# Patient Record
Sex: FEMALE | Race: WHITE
[De-identification: names, ages, dates, MRNs, and addresses within clinical notes are randomized per-mention and may not be internally consistent; named-entity substitution may affect disease eponyms.]

---

## 2016-12-31 VITALS — SYSTOLIC BLOOD PRESSURE: 145 MMHG | DIASTOLIC BLOOD PRESSURE: 83 MMHG

## 2017-01-05 ENCOUNTER — HOSPITAL ENCOUNTER (EMERGENCY)
Dept: HOSPITAL 44 - ED | Age: 62
Discharge: HOME | End: 2017-01-05
Payer: SELF-PAY

## 2017-01-05 DIAGNOSIS — J20.9: Primary | ICD-10-CM

## 2017-01-05 PROCEDURE — 71020: CPT

## 2017-01-05 PROCEDURE — 87400 INFLUENZA A/B EACH AG IA: CPT

## 2017-01-05 PROCEDURE — 99283 EMERGENCY DEPT VISIT LOW MDM: CPT

## 2017-01-05 RX ADMIN — AZITHROMYCIN ONE MG: 250 TABLET, FILM COATED ORAL at 21:05

## 2017-01-05 NOTE — DIAGNOSTIC IMAGING REPORT
[facility]

 [address] [facility].[address] [address]

~

Report Submission Date: 2017 8:33:31 PM CST







Patient ~ Study  

 

Name: HOSEA MORATAYA ~ Date: 2017 8:22:11 PM CST

 

MRN: [id number] ~ Modality Type: CR

 

Gender: F ~ Description: CHEST

 

: 53 ~ Institution: [facility]

 

Physician: DESTINY CASANOVA  ~ ~ ~





~

Chest PA and lateral views 

Clinical history: Cough and congestion with fever 



A borderline heart shadow. No acute infiltrates or pleural effusion. Normal 
bony thorax. 



Impression: 

Borderline heart shadow 

No acute infiltrates or pleural effusion

~

Electronically signed on 2017 8:33:31 PM CST by:

Jorge MEDEROS

## 2017-01-05 NOTE — ED PHYSICIAN DOCUMENTATION
General Adult





- HISTORIAN


Historian: patient





- HPI


Stated Complaint: Past 2 days, chest congestion/sinus drainage/body aches


Chief Complaint: General Adult


Additional Information: 





2 days non-productive cough, rhinorrhea, feverish, body aches. Got flu shot 

about a week ago. 





- ROS


CONST: fever (feverish)





- PAST HX


Past History: COPD


Allergies/Adverse Reactions: 


 Allergies











Allergy/AdvReac Type Severity Reaction Status Date / Time


 


morphine Allergy Severe Anaphylaxis Verified 01/05/17 19:33














Home Medications: 


 Ambulatory Orders











 Medication  Instructions  Recorded


 


Ramipril [Altace] 10 mg PO BID 02/22/15














- SOCIAL HX


Smoking History: non-smoker, secondhand





- FAMILY HX


Family History: No





- VITAL SIGNS


Vital Signs: 





 Vital Signs











Temp Pulse Resp BP Pulse Ox


 


 98.2 F   79   20   145/83   97 


 


 01/05/17 18:55  01/05/17 18:55  01/05/17 18:55  12/31/16 19:10  01/05/17 18:55














- REVIEWED ASSESSMENTS


Nursing Assessment  Reviewed: Yes


Vitals Reviewed: Yes





Progress





- Progress


Progress: 








Chest PA and lateral views 


Clinical history: Cough and congestion with fever 





A borderline heart shadow. No acute infiltrates or pleural effusion. Normal 

bony thorax. 





Impression: 


Borderline heart shadow 


No acute infiltrates or pleural effusion





 








Electronically signed on Jan 5, 2017 8:33:31 PM CST by:





Jorge Tobar





Influenza swab negative





ED Results Lab/Radiology





- Orders


Orders: 





 ED Orders











 Category Date Time Status


 


 CHEST 2 VIEW [CHEST P.A.&LAT 2 VIEWS] [RAD] Stat Exams  01/05/17 Ordered


 


 INFLUENZA A&B Stat Lab  01/05/17 19:59 Ordered














General Adult Physical Exam





- PHYSICAL EXAM


GENERAL APPEARANCE: moderate distress


EENT: eye inspection normal, ENT inspection normal, pharynx normal, JIMMY, other 

(clear rhinorrhea)


NECK: normal inspection, supple


RESPIRATORY: no resp distress, breath sounds normal


CVS: reg rate & rhythm, heart sounds normal


ABDOMEN: soft, normal bowel sounds


RECTAL: deferred


BACK: normal inspection


SKIN: warm/dry, normal color


EXTREMITIES: normal range of motion, no evidence of injury


NEURO: CN's nml as tested, motor nml, sensation nml, cognition normal





Discharge


Clincal Impression: 


 Bronchitis





Referrals: 


Jorge Herrmann [Primary Care Provider] - 2 Days


Home Medications: 


Ambulatory Orders





Ramipril [Altace] 10 mg PO BID 02/22/15 








Condition: Fair


Disposition: 01 HOME, SELF-CARE


Decision to Admit: NO


Decision Time: 21:00

## 2017-02-16 ENCOUNTER — HOSPITAL ENCOUNTER (EMERGENCY)
Dept: HOSPITAL 44 - ED | Age: 62
Discharge: HOME | End: 2017-02-16
Payer: SELF-PAY

## 2017-02-16 VITALS — SYSTOLIC BLOOD PRESSURE: 134 MMHG | DIASTOLIC BLOOD PRESSURE: 85 MMHG

## 2017-02-16 DIAGNOSIS — M54.32: Primary | ICD-10-CM

## 2017-02-16 PROCEDURE — 96372 THER/PROPH/DIAG INJ SC/IM: CPT

## 2017-02-16 PROCEDURE — 99283 EMERGENCY DEPT VISIT LOW MDM: CPT

## 2017-02-16 NOTE — ED PHYSICIAN DOCUMENTATION
General Adult





- HISTORIAN


Historian: patient





- HPI


Stated Complaint: left hip pain


Chief Complaint: General Adult


Further Comments: yes (61 year old female patient presents with left hip pain 

for the past 2 days, denies any fall or injury.  States she took Tylenol 

yesterday but it didn't help.)





- ROS


CONST: no problems


EYES/ENT: none


CVS/RESP: none


GI/: none


MS/SKIN/LYMPH: joint pain (left hip)





- PAST HX


Past History: COPD, hypertension


Allergies/Adverse Reactions: 


 Allergies











Allergy/AdvReac Type Severity Reaction Status Date / Time


 


morphine Allergy Severe Anaphylaxis Verified 01/05/17 19:33














Home Medications: 


 Ambulatory Orders











 Medication  Instructions  Recorded


 


Unobtainable [Unobtainable]  02/16/17














- SOCIAL HX


Smoking History: non-smoker





- FAMILY HX


Family History: No





- VITAL SIGNS


Vital Signs: 





 Vital Signs











Temp Pulse Resp BP Pulse Ox


 


 98.1 F   80   16   134/85   97 


 


 02/16/17 16:10  02/16/17 16:10  02/16/17 16:10  02/16/17 16:10  02/16/17 16:10














- REVIEWED ASSESSMENTS


Nursing Assessment  Reviewed: Yes


Vitals Reviewed: Yes





ED Results Lab/Radiology





- Orders


Orders: 





 ED Orders











 Category Date Time Status


 


 Ketorolac Tromethamine [Toradol] Med  02/16/17 16:27 Once





 60 mg IM NOW ONE   














General Adult Physical Exam





- PHYSICAL EXAM


GENERAL APPEARANCE: mild distress


EENT: eye inspection normal, JIMMY


RESPIRATORY: no resp distress, chest non-tender, breath sounds normal


CVS: reg rate & rhythm, heart sounds normal, equal pulses, no murmur, no gallop

, PMI nml, no JVD, no friction rub, 24


ABDOMEN: soft, no organomegaly, normal bowel sounds, no abdominal bruit, no 

distension


SKIN: normal color, warm/dry, NR, INT, PAL, DR


EXTREMITIES: normal range of motion, no evidence of injury, no edema, other (

left hip pain with palpation, patient states it radiates down the left leg.  No 

shortening or rotation noted. )


NEURO: oriented X3, CN's nml as tested, motor nml, sensation nml, mood/affect 

nml





Discharge


Clincal Impression: 


Sciatica


Qualifiers:


 Laterality: left Qualified Code(s): M54.32 - Sciatica, left side





Referrals: 


Jorge Herrmann [Primary Care Provider] - 2 Days


Home Medications: 


Ambulatory Orders





Unobtainable [Unobtainable]  02/16/17 








Condition: Stable


Disposition: 01 HOME, SELF-CARE


Decision to Admit: NO


Decision Time: 16:31

## 2017-05-05 ENCOUNTER — HOSPITAL ENCOUNTER (EMERGENCY)
Dept: HOSPITAL 44 - ED | Age: 62
Discharge: HOME | End: 2017-05-05
Payer: COMMERCIAL

## 2017-05-05 VITALS — SYSTOLIC BLOOD PRESSURE: 120 MMHG | DIASTOLIC BLOOD PRESSURE: 80 MMHG

## 2017-05-05 DIAGNOSIS — M54.32: Primary | ICD-10-CM

## 2017-05-05 PROCEDURE — 99283 EMERGENCY DEPT VISIT LOW MDM: CPT

## 2017-05-05 NOTE — ED PHYSICIAN DOCUMENTATION
General Adult





- HISTORIAN


Historian: patient, child (son)





- HPI


Stated Complaint: hip pain


Chief Complaint: General Adult


Additional Information: 





Left hip pain for months. Recently had x-rays per Dr. Herrmann. Pt's son 

received letter in mail that said x-rays showed arthritis. When son called 

[facility] office he was told no doctors in the office and to bring pt to ER. 

Pain is unchanged. Denies numbness, tingling, loss of bowel or bladder control. 





- ROS


CONST: no problems





- PAST HX


Past History: hypertension


Allergies/Adverse Reactions: 


 Allergies











Allergy/AdvReac Type Severity Reaction Status Date / Time


 


morphine Allergy Severe Anaphylaxis Verified 05/05/17 17:57














Home Medications: 


 Ambulatory Orders











 Medication  Instructions  Recorded


 


Ramipril [Altace] 10 mg PO BID 02/16/17


 


predniSONE [Deltasone] 20 mg PO QD #7 tablet 05/05/17














- SOCIAL HX


Smoking History: cigarettes





- FAMILY HX


Family History: No





- VITAL SIGNS


Vital Signs: 





 Vital Signs











Temp Pulse Resp BP Pulse Ox


 


 98.1 F   95 H  16   121/78   99 


 


 05/05/17 17:39  05/05/17 17:39  05/05/17 17:39  05/05/17 17:39  05/05/17 17:39














- REVIEWED ASSESSMENTS


Nursing Assessment  Reviewed: Yes


Vitals Reviewed: Yes





Progress





- Progress


Progress: 





Make an appointment with your provider to see how he would like to treat your 

arthritis. 





General Adult Physical Exam





- PHYSICAL EXAM


GENERAL APPEARANCE: mild distress


EENT: eye inspection normal, ENT inspection normal


NECK: normal inspection


RESPIRATORY: no resp distress


BACK: normal inspection


SKIN: warm/dry, normal color


EXTREMITIES: no evidence of injury, no edema, other (left hip rotation w/o pain)


NEURO: CN's nml as tested, motor nml, sensation nml, other (reflexes 2+ 

throughout. Can plantar and dorsi flex toes against resistance)





Discharge


Clincal Impression: 


 Sciatica





Prescriptions: 


predniSONE [Deltasone] 20 mg PO QD #7 tablet


Home Medications: 


Ambulatory Orders





Ramipril [Altace] 10 mg PO BID 02/16/17 


predniSONE [Deltasone] 20 mg PO QD #7 tablet 05/05/17 








Condition: Good


Disposition: 01 HOME, SELF-CARE


Decision to Admit: NO


Decision Time: 18:14

## 2017-05-11 ENCOUNTER — HOSPITAL ENCOUNTER (EMERGENCY)
Dept: HOSPITAL 44 - ED | Age: 62
Discharge: HOME | End: 2017-05-11
Payer: COMMERCIAL

## 2017-05-11 VITALS — SYSTOLIC BLOOD PRESSURE: 134 MMHG | DIASTOLIC BLOOD PRESSURE: 72 MMHG

## 2017-05-11 DIAGNOSIS — R51: Primary | ICD-10-CM

## 2017-05-11 DIAGNOSIS — E11.8: ICD-10-CM

## 2017-05-11 PROCEDURE — 96372 THER/PROPH/DIAG INJ SC/IM: CPT

## 2017-05-11 PROCEDURE — 99283 EMERGENCY DEPT VISIT LOW MDM: CPT

## 2017-05-11 PROCEDURE — 81002 URINALYSIS NONAUTO W/O SCOPE: CPT

## 2017-05-11 RX ADMIN — ORPHENADRINE CITRATE ONE MG: 30 INJECTION, SOLUTION INTRAMUSCULAR; INTRAVENOUS at 19:58

## 2017-05-11 RX ADMIN — KETOROLAC TROMETHAMINE ONE MG: 30 INJECTION, SOLUTION INTRAMUSCULAR at 19:59

## 2017-05-12 LAB
APPEARANCE UR: CLEAR
COLOR,URINE: YELLOW
PH UR STRIP: 7 [PH] (ref 5–8)
RBC UR QL: NEGATIVE
UROBILINOGEN URINE: 0.2 EU (ref 0.2–1)

## 2017-05-12 NOTE — ED PHYSICIAN DOCUMENTATION
Headache





- HISTORIAN


Historian: patient





- HPI


Stated Complaint: headache


Chief Complaint: Headache


Additional Information: 





burns and increased freq of urination


Onset: hours


Timing: still present


New Gradual Onset: No


Exposure To: none


Severity: moderate


Quality: similar to previous, other (whole head)


Associated Symptoms: denies: problems with vision, sensitivity to light, nausea

, vomiting, speech problems


Preceding Symptoms: denies: visual disturbance


Further Comments: no


Last known Well Code/Unknown Code: Unknown





- ROS


NEURO/PSYCH: denies: confusion, anxiety, depression, fainting


EYES/ENT: denies: sore throat, difficulty swallowing, sinus pain, drainage


CVS/RESP: none


GI/: problems urinating (burning).  denies: abdominal pain, diarrhea, 

incontinence


MS/SKIN/LYMPH: denies: muscle aches, back pain, rash, skin lesions, swollen 

glands


all systems neg except as marked: Yes





- PAST HX


Medical History: hypertension


Surgical History: no surgical history


Immunizations: referred to PCP


Allergies/Adverse Reactions: 


 Allergies











Allergy/AdvReac Type Severity Reaction Status Date / Time


 


morphine Allergy Severe Anaphylaxis Verified 05/11/17 19:05














Home Medications: 


 Ambulatory Orders











 Medication  Instructions  Recorded


 


Ramipril [Altace] 10 mg PO BID 02/16/17














- SOCIAL HX


Smoking History: cigarettes


Alcohol Use: none





- Family HX


Family History: none





- VITAL SIGNS


Vital Signs: 





 Vital Signs











Temp Pulse Resp BP Pulse Ox


 


 98.4 F   76   18   134/72   97 


 


 05/11/17 20:31  05/11/17 20:31  05/11/17 20:31  05/11/17 20:31  05/11/17 20:31














- REVIEWED ASSESSMENTS


Nursing Assessment  Reviewed: Yes


Vitals Reviewed: Yes





Progress





- Results/Orders


Results/Orders: 





no testing ordered





- Progress


Progress: 





pt. given 60 mg toradol and 60 mg norflex im in er





Critical Care Note





- Critical Care Note


Total Time (mins): 0





ED Results Lab/Radiology





- Lab Results


Lab Results: 





 Lab Results











  05/11/17





  19:50


 


Urine Color  Yellow 





   (YELLOW) 


 


Urine Appearance  Clear 





   (CLEAR) 


 


Urine pH  7.0 





   (5.0 - 8.0) 


 


Ur Specific Gravity  1.010 





   (1.010-1.030) 


 


Urine Protein  Negative mg/dL mg/dL





   (NEGATIVE) 


 


Urine Ketones  Negative mg/dL mg/dL





   (NEGATIVE) 


 


Urine Occult Blood  Negative 





   (NEGATIVE) 


 


Urine Nitrite  Negative 





   (NEGATIVE) 


 


Urine Bilirubin  Negative 





   (NEGATIVE) 


 


Urine Urobilinogen  0.2 Eu Eu





   (0.2-1.0) 


 


Ur Leukocyte Esterase  Negative 





   (NEGATIVE) 


 


Urine Glucose  Trace mg/dL mg/dL





   (NEGATIVE) 














- Radiology


Radiology Impressions: 





no x-rays ordered





- Orders


Orders: 





 ED Orders











 Category Date Time Status


 


 UA MACRO DIP ONLY Routine Lab  05/11/17 19:50 Completed


 


 Ketorolac Tromethamine [Toradol] Med  05/11/17 19:52 Discontinued





 60 mg IM NOW ONE   


 


 Orphenadrine Citrate [Norflex] Med  05/11/17 19:52 Discontinued





 60 mg IM NOW ONE   














Headache Physical Exam





- EXAM


General Appearance: alert, mild distress


EENT: no facial swelling, eyes nml inspection, PERRL, unequal pupils, nml ENT.  

No: tender temporal artery, pain over sinuses, photophobia


Neck: normal inspection, thyroid normal, supple


Respiratory: no resp distress, chest non-tender, breath sounds normal


CVS: reg. rate & rhythm, heart sounds nml.  No: murmur, tachycardia, bradycardia

, decreased pulse(s)


Abdomen: non-tender, no organomegaly, nml bowel sounds


Skin: color nml, no rash


Extremitites: non-tender, normal range of motion, no evidence of injury, no 

edema





- NEURO/PSYCH


Higher Functions: alert, oriented x3


Cranial: nml as tested, no evidence of acute CVA


Cerebellar: nml as tested


Sensorimotor: motor nml, sensation nml





Discharge


Clincal Impression: 


 Tension headache





Diabetes


Qualifiers:


 Diabetes mellitus type: type 2 Diabetes mellitus complication status: with 

unspecified complications Diabetes mellitus long term insulin use: without long 

term use Qualified Code(s): E11.8 - Type 2 diabetes mellitus with unspecified 

complications





Referrals: 


Jorge Herrmann [Primary Care Provider] - 2 Days


Home Medications: 


Ambulatory Orders





Ramipril [Altace] 10 mg PO BID 02/16/17 








Comments: 





discharged with scripts for parafon forte, meloxicam and recommendation to 

follow up with primary care for better diabetes control


Condition: Stable


Disposition: 01 HOME, SELF-CARE


Decision to Admit: NO


Decision Time: 20:25

## 2017-06-14 ENCOUNTER — HOSPITAL ENCOUNTER (EMERGENCY)
Dept: HOSPITAL 44 - ED | Age: 62
Discharge: HOME | End: 2017-06-14
Payer: COMMERCIAL

## 2017-06-14 VITALS — SYSTOLIC BLOOD PRESSURE: 161 MMHG | DIASTOLIC BLOOD PRESSURE: 84 MMHG

## 2017-06-14 VITALS — SYSTOLIC BLOOD PRESSURE: 161 MMHG | DIASTOLIC BLOOD PRESSURE: 90 MMHG

## 2017-06-14 DIAGNOSIS — F41.9: ICD-10-CM

## 2017-06-14 DIAGNOSIS — J44.1: ICD-10-CM

## 2017-06-14 DIAGNOSIS — J18.9: Primary | ICD-10-CM

## 2017-06-14 DIAGNOSIS — J21.9: Primary | ICD-10-CM

## 2017-06-14 LAB
BASOPHILS NFR BLD: 0.7 % (ref 0–1.5)
EGFR (AFRICAN): > 60
EGFR (NON-AFRICAN): > 60
EOSINOPHIL NFR BLD: 7.3 % (ref 0–6.8)
MCH RBC QN AUTO: 29.9 PG (ref 28–34)
MCV RBC AUTO: 90.5 FL (ref 80–100)
MONOCYTES %: 6 % (ref 0–11)
NEUTROPHILS #: 3.2 # K/UL (ref 1.4–7.7)

## 2017-06-14 PROCEDURE — 71020: CPT

## 2017-06-14 PROCEDURE — 93005 ELECTROCARDIOGRAM TRACING: CPT

## 2017-06-14 PROCEDURE — 84484 ASSAY OF TROPONIN QUANT: CPT

## 2017-06-14 PROCEDURE — 99284 EMERGENCY DEPT VISIT MOD MDM: CPT

## 2017-06-14 PROCEDURE — 96374 THER/PROPH/DIAG INJ IV PUSH: CPT

## 2017-06-14 PROCEDURE — 85025 COMPLETE CBC W/AUTO DIFF WBC: CPT

## 2017-06-14 PROCEDURE — 71275 CT ANGIOGRAPHY CHEST: CPT

## 2017-06-14 PROCEDURE — 87040 BLOOD CULTURE FOR BACTERIA: CPT

## 2017-06-14 PROCEDURE — S1016 NON-PVC INTRAVENOUS ADMINIST: HCPCS

## 2017-06-14 PROCEDURE — 99283 EMERGENCY DEPT VISIT LOW MDM: CPT

## 2017-06-14 PROCEDURE — 80053 COMPREHEN METABOLIC PANEL: CPT

## 2017-06-14 PROCEDURE — 36415 COLL VENOUS BLD VENIPUNCTURE: CPT

## 2017-06-14 RX ADMIN — AZITHROMYCIN ONE MG: 250 TABLET, FILM COATED ORAL at 19:55

## 2017-06-14 NOTE — ED PHYSICIAN DOCUMENTATION
Chest Pain





- HISTORIAN


Historian: patient, child





- HPI


Stated Complaint: chest pain and shortness of breath


Chief Complaint: Chest Pain


Additional Information: 





pt here w/son c/o sob and chest discomfort onset on arising this am.  had 

recent card w/u at [facility] found ok.


Timing: sudden onset


Duration: waxing, waning


Last known Well Date: 06/12/17


Last Known Well Time: 18:30


Severity: moderate


Quality: pressure, other (sob and wheezing)


Chest Pain Radiation: no radiation


Chest Pain Signs/Symptoms: dyspnea.  denies: nausea, vomiting, diaphoresis, 

cool extremities


Worsened By: exertion





- ROS


CONST: no problems


GI/: none


EYES/ENT: none


SKIN/ENDO: none


NEURO/PSYCH: none





- PAST HX


MI risk factors: hypertension, other (copd hi cholesterol)


Neuro deficit: none


GI disease: none


Surgeries/Procedures: none


Allergies/Adverse Reactions: 


 Allergies











Allergy/AdvReac Type Severity Reaction Status Date / Time


 


morphine Allergy Severe Anaphylaxis Verified 06/14/17 10:15











Home Medications: 


 Ambulatory Orders











 Medication  Instructions  Recorded


 


Ramipril [Altace] 10 mg PO BID 02/16/17














- SOCIAL HX


Smoking History: non-smoker (lived w/ cigarette smoke long time)


Alcohol Use: none


Drug Use: none





- FAMILY HX


Family HX: none





- VITAL SIGNS


Vital Signs: 





 Vital Signs











Temp Pulse Resp BP Pulse Ox


 


          134/72    


 


          05/11/17 20:31   














- REVIEWED ASSESSMENTS


Nursing Assessment  Reviewed: Yes


Vitals Reviewed: Yes





ED Results Lab/Radiology





- Radiology


Radiology Impressions: 





POSS RLL CONGESTION-RAD SAYS INFILTRATE





- Orders


Orders: 





 ED Orders











 Category Date Time Status


 


 Ipratropium/Albuterol Sulfate [Duoneb] Med  06/14/17 09:51 Discontinued





 3 ml NEB .STK-MED ONE   


 


 Ipratropium/Albuterol Sulfate [Duoneb] Med  06/14/17 10:22 Discontinued





 3 ml NEB .STK-MED ONE   


 


 Ipratropium/Albuterol Sulfate [Duoneb] Med  06/14/17 10:27 Once





 3 ml NEB NOW ONE   


 


 Ipratropium/Albuterol Sulfate [Duoneb] Med  06/14/17 10:27 Once





 3 ml NEB NOW ONE   


 


 methylPREDNISolone SOD SUCC [Solu-MEDROL] Med  06/14/17 10:22 Discontinued





 125 mg .ROUTE .STK-MED ONE   


 


 methylPREDNISolone SOD SUCC [Solu-MEDROL] Med  06/14/17 10:28 Once





 125 mg IVP NOW ONE   














Chest Pain Physical Exam





- EXAM


General Appearance: mild distress, moderate distress


EENT: eye inspection normal


Neck: nml inspection, no carotid bruit


Respiratory: No: no resp. distress (mild sig wheezing)


CVS: reg. rate & rhythm, no murmur, no gallop, no friction rub


Abdomen: soft, non-tender


Skin: warm/dry, normal color.  No: cyanosis, diaphoresis, jaundice


Extremities: non-tender, normal range of motion, no evidence of injury


Neuro: oriented X3, motor nml, sensation nml, mood/affect nml





Discharge


Clincal Impression: 


 Right lower lobe pneumonia, ASSOC COPD





Referrals: 


Jorge Herrmann [Primary Care Provider] - 2 Days


Home Medications: 


Ambulatory Orders





Ramipril [Altace] 10 mg PO BID 02/16/17 








Comments: 





TO F/U W/PCP DERRICK BOB


Condition: Good


Disposition: 01 HOME, SELF-CARE


Decision to Admit: NO


Decision Time: 12:24

## 2017-06-14 NOTE — ED PHYSICIAN DOCUMENTATION
Dyspnea





- HISTORIAN


Historian: patient, child





- HPI


Chief Complaint: Dyspnea


Additional Information: 





re adm ed today inc dyspnea tachycardia and tachypnea w/ant/post chest pain


Onset: other (on awakening this am slight yesterday)


Duration: continues in ED, worse (from this am w/tachycardia and tachypnea), 

other


Initiating Event: upper respiratory illness, exposure to smoke (excess 

cigarette smoke)


Severity: moderate, severe


Exacerbated By: change in position, exertion, coughing


Associated Symptoms: chest pain, chest discomfort, heart racing.  denies: 

bloody cough, productive cough





- ROS


CONST: no problems


GI/: none


MS/SKIN/LYMPH: none





- PAST HX


Lung Disease: COPD, other (htn hi chol)


PE Risk Factors: hypertension


Surgeries/Procedures: none


Allergies/Adverse Reactions: 


 Allergies











Allergy/AdvReac Type Severity Reaction Status Date / Time


 


morphine Allergy Severe Anaphylaxis Verified 06/14/17 12:53














Home Medications: 


 Ambulatory Orders











 Medication  Instructions  Recorded


 


Ramipril [Altace] 10 mg PO BID 02/16/17


 


Azithromycin 500 mg PO D #5 tablet 06/14/17














- SOCIAL HX


Smoking History: non-smoker


Alcohol Use: none


Drug Use: none





- FAMILY HX


Family History: no significant history





- VITAL SIGNS


Vital Signs: 





 Vital Signs











Temp Pulse Resp BP Pulse Ox


 


          161/90    


 


          06/14/17 12:56   














ED Results Lab/Radiology





- Radiology


Radiology Impressions: 





ct reveals bronchitis other lab ok pt ha her azithro but has not taken any yet.

  will give her one now send home





- Orders


Orders: 





 ED Orders











 Category Date Time Status


 


 CT CHEST W & W/O CONTRAST Stat Exams  06/14/17 Ordered


 


 ARTERIAL BLOOD GAS Stat Lab  06/14/17 Uncollected


 


 EKG WITH COMPARISON Stat Ther  06/14/17 Ordered














Dyspnea Physical Exam





- EXAM


General Appearance: moderate distress


EENT: eye inspection normal


Respiratory: wheezes, no pleuritic chest pain


Abdomen: non-tender, no distention


Skin: cyanosis (very slight)


Extremities: non-tender, normal range of motion


Neuro/Psych: oriented x3, motor nml, sensation nml, mood/affect nml





Discharge


Clincal Impression: 


 acute bronchiotis, anxiety





Referrals: 


Jorge Herrmann [Primary Care Provider] - 2 Days


Home Medications: 


Ambulatory Orders





Ramipril [Altace] 10 mg PO BID 02/16/17 


Azithromycin 500 mg PO D #5 tablet 06/14/17 








Comments: 





pt sig better admits has not yet taken her azithro--will give here have her f/u 

w;/pcp or rt ed


Condition: Good


Disposition: 01 HOME, SELF-CARE


Decision to Admit: NO


Decision Time: 19:38

## 2017-06-14 NOTE — DIAGNOSTIC IMAGING REPORT
GERI NORMAN 

[facility]

[address] [facility].[address] [address]

 

 

 

 

Report Submission Date: 2017 11:37:13 AM CDT

Patient       Study

Name:   HOSEA MORATAYA       Date:   2017 11:20:07 AM CDT

MRN:   [id number]       Modality Type:   CR

Gender:   F       Description:   CHEST

:   53       Institution:   [facility]

Physician:   GERI NORMAN

         

 

 

Chest - two views 

Clinical history:  Chest pain and shortness of breath this morning. 

Findings:  Examination of the chest in PA and lateral views with comparison to 
examination of 2017 demonstrates again slight elevation right 
hemidiaphragm.  There is patchy right basilar infiltrate medially.  The left 
lung is clear.  Cardiovascular and mediastinal silhouettes are stable.  The 
aorta is atherosclerotic. 

Impression: 

1.  Elevated right hemidiaphragm with right basilar infiltrate in the medial 
base. Follow-up to resolution is recommended. 

2.  Aortic atherosclerosis.

 

Electronically signed on 2017 11:37:13 AM CDT by:

Pipo MEDEROS

## 2017-06-14 NOTE — DIAGNOSTIC IMAGING REPORT
GERI NORMAN 

[facility]

[address] [facility] [address] [address]

 

 

 

 

Report Submission Date: 2017 7:29:59 PM CDT

Patient       Study

Name:   HOSEA MORATAYA       Date:   2017 7:02:43 PM CDT

MRN:   [id number]       Modality Type:   CT\SR

Gender:   F       Description:   CT ANGIOGRAPHY CHEST 7

:   53       Institution:   [facility]

Physician:   GERI NORMAN

         

 

 

Computed tomography of the chest with contrast 



History: Chest pain and shortness of breath 



Findings: Transverse chest sections are obtained after 92 mL intravenous 
omnipaque 350 from which multiplanar reformatted images are obtained. 



Marked hepatic steatosis, calcified splenic and chest granulomas, marked 
gallbladder contraction, mild atherosclerotic calcification and obesity are 
observed. There is no evidence of acute pulmonary embolism. Minimal bibasilar 
atelectasis is present. There is no confluent infiltrate or pleural effusion. 
Minimal bilateral predominately basilar bronchial wall thickening may represent 
mild bronchitis. This is associated with air trapping. 



Impression: 



1. Mild air trapping and bibasilar bronchitis. 



2. No evidence of acute pulmonary embolism. 



3. Obesity and marked hepatic steatosis. 



4. Mild atherosclerosis, old granulomatous disease, and minimal bibasilar 
atelectasis.

 

Electronically signed on 2017 7:29:59 PM CDT by:

Vincent MEDEROS

## 2017-07-10 ENCOUNTER — HOSPITAL ENCOUNTER (EMERGENCY)
Dept: HOSPITAL 44 - ED | Age: 62
Discharge: HOME | End: 2017-07-10
Payer: COMMERCIAL

## 2017-07-10 VITALS — SYSTOLIC BLOOD PRESSURE: 136 MMHG | DIASTOLIC BLOOD PRESSURE: 84 MMHG

## 2017-07-10 DIAGNOSIS — G44.209: Primary | ICD-10-CM

## 2017-07-10 PROCEDURE — 99283 EMERGENCY DEPT VISIT LOW MDM: CPT

## 2017-07-10 NOTE — ED PHYSICIAN DOCUMENTATION
Headache





- HISTORIAN


Historian: patient





- HPI


Stated Complaint: HBP


Chief Complaint: Headache


Onset: hours


Timing: still present


New Gradual Onset: No


Exposure To: none


Severity: moderate


Quality: pain


Associated Symptoms: dizziness.  denies: fever, chills, sweating, problems with 

vision, sensitivity to light, nausea, vomiting, neck pain, stiffness, speech 

problems, weakness, trouble walking, tingling, numbness, light-headedness, other


Preceding Symptoms: denies: visual disturbance


Further Comments: yes (61 year old female patient presents with complaint of 

headache and high BP.  Patient reports taking ibuprofen this afternoon with no 

improvement.  Patient was in the ER earlier today with her . States she 

did not have a headache at that time. Denies nausea, photophobia, fever or CP.  

C/O dizziness.)





- ROS


NEURO/PSYCH: denies: confusion, anxiety, depression, fainting, other


EYES/ENT: denies: sore throat, difficulty swallowing, sinus pain, drainage, 

other


CVS/RESP: none


GI/: denies: abdominal pain, diarrhea, problems urinating, incontinence, other


MS/SKIN/LYMPH: denies: muscle aches, back pain, rash, skin lesions, swollen 

glands, other


all systems neg except as marked: Yes





- PAST HX


Medical History: hypertension


Allergies/Adverse Reactions: 


 Allergies











Allergy/AdvReac Type Severity Reaction Status Date / Time


 


morphine Allergy Severe Anaphylaxis Verified 06/14/17 12:53














Home Medications: 


 Ambulatory Orders











 Medication  Instructions  Recorded


 


Ramipril [Altace] 10 mg PO BID 02/16/17


 


Ibuprofen [Advil] 800 mg PO TID 07/10/17














- SOCIAL HX


Smoking History: cigarettes





- Family HX


Family History: denies: none





- VITAL SIGNS


Vital Signs: 





 Vital Signs











Temp Pulse Resp BP Pulse Ox


 


 98.9 F   92 H  16   136/84   98 


 


 07/10/17 21:23  07/10/17 21:23  07/10/17 21:23  07/10/17 21:23  07/10/17 21:23














- REVIEWED ASSESSMENTS


Nursing Assessment  Reviewed: Yes


Vitals Reviewed: Yes





ED Results Lab/Radiology





- Orders


Orders: 





 ED Orders











 Category Date Time Status


 


 Acetaminophen [Tylenol Extra Strength] Med  07/10/17 21:35 Once





 1,000 mg PO NOW ONE   














Headache Physical Exam





- EXAM


General Appearance: no acute distress, alert, other (very poor personal hygiene)


Respiratory: no resp distress, chest non-tender, breath sounds normal


CVS: reg. rate & rhythm, heart sounds nml


Abdomen: non-tender, no organomegaly, nml bowel sounds, no distention


Skin: color nml, no rash, warm, nml palp., dry


Extremitites: non-tender, normal range of motion, no evidence of injury, no 

edema, J, NP





- NEURO/PSYCH


Higher Functions: alert, oriented x3, nml speech, mood/affect nml





Discharge


Clincal Impression: 


 Tension headache





Referrals: 


Jorge Herrmann [Primary Care Provider] - 2 Days


Home Medications: 


Ambulatory Orders





Ramipril [Altace] 10 mg PO BID 02/16/17 


Ibuprofen [Advil] 800 mg PO TID 07/10/17 








Condition: Stable


Disposition: 01 HOME, SELF-CARE


Decision to Admit: NO


Decision Time: 21:43

## 2017-07-28 ENCOUNTER — HOSPITAL ENCOUNTER (EMERGENCY)
Dept: HOSPITAL 44 - ED | Age: 62
Discharge: HOME | End: 2017-07-28
Payer: COMMERCIAL

## 2017-07-28 VITALS — DIASTOLIC BLOOD PRESSURE: 93 MMHG | SYSTOLIC BLOOD PRESSURE: 132 MMHG

## 2017-07-28 DIAGNOSIS — N30.00: Primary | ICD-10-CM

## 2017-07-28 LAB
BASOPHILS NFR BLD: 0.2 % (ref 0–1.5)
EGFR (AFRICAN): > 60
EGFR (NON-AFRICAN): > 60
EOSINOPHIL NFR BLD: 0.4 % (ref 0–6.8)
MCH RBC QN AUTO: 30.1 PG (ref 28–34)
MCV RBC AUTO: 90 FL (ref 80–100)
MONOCYTES %: 1.7 % (ref 0–11)
NEUTROPHILS #: 7 # K/UL (ref 1.4–7.7)

## 2017-07-28 PROCEDURE — 71010: CPT

## 2017-07-28 PROCEDURE — 81002 URINALYSIS NONAUTO W/O SCOPE: CPT

## 2017-07-28 PROCEDURE — 72100 X-RAY EXAM L-S SPINE 2/3 VWS: CPT

## 2017-07-28 PROCEDURE — 72040 X-RAY EXAM NECK SPINE 2-3 VW: CPT

## 2017-07-28 PROCEDURE — 36415 COLL VENOUS BLD VENIPUNCTURE: CPT

## 2017-07-28 PROCEDURE — 87040 BLOOD CULTURE FOR BACTERIA: CPT

## 2017-07-28 PROCEDURE — 99283 EMERGENCY DEPT VISIT LOW MDM: CPT

## 2017-07-28 PROCEDURE — 80053 COMPREHEN METABOLIC PANEL: CPT

## 2017-07-28 PROCEDURE — 96372 THER/PROPH/DIAG INJ SC/IM: CPT

## 2017-07-28 PROCEDURE — S1016 NON-PVC INTRAVENOUS ADMINIST: HCPCS

## 2017-07-28 PROCEDURE — 85025 COMPLETE CBC W/AUTO DIFF WBC: CPT

## 2017-07-28 RX ADMIN — LIDOCAINE HYDROCHLORIDE ONE MG: 10 INJECTION, SOLUTION EPIDURAL; INFILTRATION; INTRACAUDAL; PERINEURAL at 19:20

## 2017-07-28 NOTE — DIAGNOSTIC IMAGING REPORT
REESE CARUSO 

[facility]

[address] [address]

[place], [place]. [address]

 

 

 

 

Report Submission Date: 2017 6:17:58 PM CDT

Patient       Study

Name:   HOSEA MORATAYA       Date:   2017 5:52:14 PM CDT

MRN:   [id number]       Modality Type:   CR

Gender:   F       Description:   CHEST

:   53       Institution:   [facility]

Physician:   REESE CARUSO

         

 

 

Chest -one view 



CLINICAL HISTORY:   

Fever. 



FINDINGS:   

Examination of the chest single PA view with comparison to examination 2017 demonstrates cardiomegaly and aortic atherosclerosis.  Monitor leads 
superimpose the chest.  Bony thorax is intact.  Lungs are free of coalescent 
infiltrate. 



IMPRESSION:   

Cardiomegaly and aortic atherosclerosis.   

No active disease.

 

Electronically signed on 2017 6:17:58 PM CDT by:

Pipo MEDEROS

## 2017-07-28 NOTE — ED PHYSICIAN DOCUMENTATION
General Adult





- HISTORIAN


Historian: patient





- HPI


Stated Complaint: Numbness to bilateral hands and legs


Chief Complaint: General Adult


Additional Information: 





numbness hands and feet


Onset: hours (8)


Timing: still present


Severity: moderate


Modifying Factors: none


Context: woke up this way


Quality: moderate


Location: hands and feet


Further Comments: no


Last known Well Date: 07/27/17


Last Known Well Time: 23:55


Last known Well Code/Unknown Code: Unknown





- ROS


CONST: no problems


EYES/ENT: none


CVS/RESP: none


GI/: none


MS/SKIN/LYMPH: none


NEURO/PSYCH: denies: headache, fainting, dizziness, tingling, numbness, 

difficulty walking, difficulty with speech, anxiety, depression





- PAST HX


Past History: hypertension


Other History: none


Surgeries/Procedures: none


Immunizations: referred to PCP


Allergies/Adverse Reactions: 


 Allergies











Allergy/AdvReac Type Severity Reaction Status Date / Time


 


morphine Allergy Severe Anaphylaxis Verified 07/28/17 17:45














Home Medications: 


 Ambulatory Orders











 Medication  Instructions  Recorded


 


Ramipril [Altace] 10 mg PO BID 02/16/17


 


Ibuprofen [Advil] 800 mg PO TID 07/10/17














- SOCIAL HX


Smoking History: cigarettes


Alcohol Use: none


Drug Use: none





- FAMILY HX


Family History: No





- VITAL SIGNS


Vital Signs: 





 Vital Signs











Temp Pulse Resp BP Pulse Ox


 


 99.8 F H  99 H  20   131/57   97 


 


 07/28/17 15:28  07/28/17 15:28  07/28/17 15:28  07/28/17 15:28  07/28/17 15:28














- REVIEWED ASSESSMENTS


Nursing Assessment  Reviewed: Yes


Vitals Reviewed: Yes





Progress





- Results/Orders


Results/Orders: 





cbc, cmp, ua, cxr, blood culturesd, c-spine and l-spine x-rays ordered





- Progress


Progress: 





pt. given 1 gram rocephin im in er





Critical Care Note





- Critical Care Note


Total Time (mins): 0





ED Results Lab/Radiology





- Lab Results


Lab Results: 





 Lab Results











  07/28/17 07/28/17





  17:20 17:20


 


WBC    8.10 K/ul K/ul





    (4.00-12.00) 


 


RBC    4.72 M/ul M/ul





    (3.90-5.20) 


 


Hgb    14.2 g/dL g/dL





    (12.0-16.0) 


 


Hct    42.5 % %





    (34.5-46.5) 


 


MCV    90.0 fl fl





    (80.0-100.0) 


 


MCH    30.1 pg pg





    (28.0-34.0) 


 


MCHC    33.4 g/dL g/dL





    (30.0-36.0) 


 


RDW    13.2 % %





    (11.3-14.3) 


 


Plt Count    258 K/mm3 K/mm3





    (130-400) 


 


Neut % (Auto)    86.4 % H %





    (39.0-79.0) 


 


Lymph % (Auto)    10.8 % L %





    (16.0-50.0) 


 


Mono % (Auto)    1.7 % %





    (0.0-11.0) 


 


Eos % (Auto)    0.4 % %





    (0.0-6.8) 


 


Baso % (Auto)    0.2 





    (0.0-1.5) 


 


Neut # (Auto)    7.0 # k/uL # k/uL





    (1.4-7.7) 


 


Lymph # (Auto)    0.9 # k/uL # k/uL





    (0.6-4.0) 


 


Mono # (Auto)    0.1 # k/uL # k/uL





    (0.0-0.9) 


 


Eos # (Auto)    0.0 # k/uL # k/uL





    (0.0-0.6) 


 


Baso # (Auto)    0.0 # k/uL # k/uL





    (0.0-0.5) 


 


Reactive Lymphs %    0.6 % %





    (0.0-5.0) 


 


Reactive Lymphs #    0.0 # k/uL # k/uL





    (0.0-0.8) 


 


Sodium  138 mmol/L mmol/L  





   (136-145)  


 


Potassium  4.0 mmol/L mmol/L  





   (3.5-5.0)  


 


Chloride  104 mmol/L mmol/L  





   ()  


 


Carbon Dioxide  28 mmol/L mmol/L  





   (20-32)  


 


BUN  12 mg/dL mg/dL  





   (10-26)  


 


Creatinine  0.8 mg/dL mg/dL  





   (0.4-1.5)  


 


Estimated Creat Clear  138   





   


 


Est GFR ( Amer)  > 60   





  (60 - ) 


 


Est GFR (Non-Af Amer)  > 60   





  (60 - ) 


 


Glucose  127 mg/dL H mg/dL  





   (70-99)  


 


Calcium  9.5 mg/dL mg/dL  





   (8.5-10.5)  


 


Total Bilirubin  0.3 mg/dL mg/dL  





   (0.2-1.2)  


 


AST  25 U/L U/L  





   (0-41)  


 


ALT  24 U/L U/L  





   (0-45)  


 


Alkaline Phosphatase  76 U/L U/L  





   ()  


 


Total Protein  7.5 g/dL g/dL  





   (6.0-8.5)  


 


Albumin  4.5 g/dL g/dL  





   (3.0-5.5)  














- Radiology


Radiology Impressions: 


cxr neg, c-spine shows ddd, l-spine shows ddd





- Orders


Orders: 





 ED Orders











 Category Date Time Status


 


 Place IV Lock 1T Care  07/28/17 16:13 Active


 


 C SPINE 2 OR 3 VIEWS [RAD] Stat Exams  07/28/17 Completed


 


 CHEST 1 VIEW [RAD] Routine Exams  07/28/17 Completed


 


 L SPINE 2 OR 3 VIEWS [RAD] Stat Exams  07/28/17 Completed


 


 BLOOD CULTURE Routine Lab  07/28/17 17:25 Received


 


 CBC/PLATELET/DIFF Routine Lab  07/28/17 17:20 Completed


 


 CMP Routine Lab  07/28/17 17:20 Completed


 


 URINALYSIS Routine Lab  07/28/17 16:06 Ordered


 


 Lidocaine 1% 5ml(IM or SUTURE) [Xylocaine] Med  07/28/17 19:03 Discontinued





 50 mg IJ NOW ONE   


 


 cefTRIAXone SODIUM [Rocephin] Med  07/28/17 19:03 Discontinued





 1 gm IM NOW ONE   














General Adult Physical Exam





- PHYSICAL EXAM


GENERAL APPEARANCE: mild distress


EENT: eye inspection normal, ENT inspection normal, pharynx normal, no signs of 

dehydration, JIMMY, no nystagmus, TM's nml


NECK: normal inspection, thyroid normal, supple


RESPIRATORY: no resp distress, chest non-tender


CVS: reg rate & rhythm, heart sounds normal, equal pulses, no murmur, no gallop


ABDOMEN: soft, no organomegaly, normal bowel sounds, no distension, tenderness (

suprapubic area)


BACK: CVA tenderness (L)


SKIN: warm/dry, normal color


EXTREMITIES: non-tender, normal range of motion


NEURO: oriented X3, CN's nml as tested, motor nml, sensation nml, mood/affect 

nml, cognition normal





Discharge


Clincal Impression: 


Urinary tract infection


Qualifiers:


 Urinary tract infection type: acute cystitis Hematuria presence: without 

hematuria Qualified Code(s): N30.00 - Acute cystitis without hematuria





Referrals: 


Jorge Herrmann [Primary Care Provider] - 2 Days


Home Medications: 


Ambulatory Orders





Ramipril [Altace] 10 mg PO BID 02/16/17 


Ibuprofen [Advil] 800 mg PO TID 07/10/17 








Comments: 





Discharged in stable condition with Bactrim DS 1 p.o. bid #14 script.


Condition: Stable


Disposition: 01 HOME, SELF-CARE


Decision to Admit: NO


Decision Time: 19:20

## 2017-07-28 NOTE — DIAGNOSTIC IMAGING REPORT
REESE CARUSO 

[facility]

[address] [facility] [address]

 

 

 

 

Report Submission Date: 2017 6:16:50 PM CDT

Patient       Study

Name:   HOSEA MORATAYA       Date:   2017 5:41:39 PM CDT

MRN:   [id number]       Modality Type:   CR

Gender:   F       Description:   SPINE

:   53       Institution:   [facility]

Physician:   REESE CARUSO

         

 

 

Cervical spine -three views 



CLINICAL HISTORY:   

Numbness in the hands. 



FINDINGS:   

Examination of the lumbar spine in AP, lateral, lateral swimmer's and open-
mouth views demonstrates straightening of the normal cervical lordosis.  Disc 
spaces are narrowed at C4-5, C5-6 and C6-7 with anterior and posterior 
osteophytes consistent with degenerative disc disease.  Prevertebral soft 
tissues are within normal limits.  The C1-2 articulation is normal and the base 
the odontoid is intact. 



IMPRESSION:   

Multilevel spondylosis.   

No fracture.

 

Electronically signed on 2017 6:16:50 PM CDT by:

Pipo MEDEROS

## 2017-07-28 NOTE — DIAGNOSTIC IMAGING REPORT
REESE CARUSO 

[facility]

[address] [facility] [address]

 

 

 

 

Report Submission Date: 2017 6:15:43 PM CDT

Patient       Study

Name:   HOSEA MORATAYA       Date:   2017 5:31:08 PM CDT

MRN:   [id number]       Modality Type:   CR

Gender:   F       Description:   SPINE

:   53       Institution:   [facility]

Physician:   REESE CARUSO

         

 

 

Lumbar spine -three views 



CLINICAL HISTORY:   

Low back pain with leg numbness. 



FINDINGS:   

Examination of the lumbar spine in AP, lateral and lateral coned-down views 
demonstrates mild levoscoliosis with the apex at L2.  The pedicles are intact 
and paravertebral soft tissues are within normal limits.  Facet joints are 
narrowed in the mid and lower lumbar vertebrae.  There is no evident fracture.  
Vascular calcification is incidentally noted in the abdominal aorta. 



IMPRESSION:   

Lumbar spondylosis and scoliosis.   

No fracture.

 

Electronically signed on 2017 6:15:43 PM CDT by:

Pipo MEDEROS

## 2017-07-29 LAB
APPEARANCE UR: CLEAR
COLOR,URINE: YELLOW
PH UR STRIP: 7.5 [PH] (ref 5–8)
RBC UR QL: NEGATIVE
UROBILINOGEN URINE: 0.2 EU (ref 0.2–1)

## 2017-08-11 ENCOUNTER — HOSPITAL ENCOUNTER (EMERGENCY)
Dept: HOSPITAL 44 - ED | Age: 62
Discharge: HOME | End: 2017-08-11
Payer: COMMERCIAL

## 2017-08-11 DIAGNOSIS — G44.201: Primary | ICD-10-CM

## 2017-08-11 LAB
BASOPHILS NFR BLD: 0.3 % (ref 0–1.5)
EGFR (AFRICAN): > 60
EGFR (NON-AFRICAN): > 60
EOSINOPHIL NFR BLD: 2.4 % (ref 0–6.8)
MCH RBC QN AUTO: 29.7 PG (ref 28–34)
MCV RBC AUTO: 91.6 FL (ref 80–100)
MONOCYTES %: 6.4 % (ref 0–11)
NEUTROPHILS #: 5.7 # K/UL (ref 1.4–7.7)

## 2017-08-11 PROCEDURE — 80053 COMPREHEN METABOLIC PANEL: CPT

## 2017-08-11 PROCEDURE — 96374 THER/PROPH/DIAG INJ IV PUSH: CPT

## 2017-08-11 PROCEDURE — 99283 EMERGENCY DEPT VISIT LOW MDM: CPT

## 2017-08-11 PROCEDURE — 96361 HYDRATE IV INFUSION ADD-ON: CPT

## 2017-08-11 PROCEDURE — 85025 COMPLETE CBC W/AUTO DIFF WBC: CPT

## 2017-08-11 PROCEDURE — S1016 NON-PVC INTRAVENOUS ADMINIST: HCPCS

## 2017-08-11 RX ADMIN — BUDESONIDE SCH MG: 0.5 SUSPENSION RESPIRATORY (INHALATION) at 22:25

## 2017-08-11 RX ADMIN — IPRATROPIUM BROMIDE AND ALBUTEROL SULFATE ONE ML: .5; 3 SOLUTION RESPIRATORY (INHALATION) at 21:32

## 2017-08-11 RX ADMIN — RETINOL, ERGOCALCIFEROL, .ALPHA.-TOCOPHEROL ACETATE, DL-, PHYTONADIONE, ASCORBIC ACID, NIACINAMIDE, RIBOFLAVIN 5-PHOSPHATE SODIUM, THIAMINE HYDROCHLORIDE, PYRIDOXINE HYDROCHLORIDE, DEXPANTHENOL, BIOTIN, FOLIC ACID, AND CYANOCOBALAMIN SCH MLS/HR: KIT at 21:33

## 2017-08-11 RX ADMIN — KETOROLAC TROMETHAMINE ONE MG: 30 INJECTION, SOLUTION INTRAMUSCULAR at 21:32

## 2017-08-11 NOTE — ED PHYSICIAN DOCUMENTATION
Headache





- HISTORIAN


Historian: patient





- HPI


Stated Complaint: headache, HBP concerns


Chief Complaint: Headache


Additional Information: 





lips dry, headache, ? high bp


Onset: hours (3)


Timing: abrupt


New Gradual Onset: Yes


Exposure To: none


Severity: moderate


Quality: pain (forehead)


Associated Symptoms: denies: fever, chills, sweating, problems with vision, 

sensitivity to light, nausea, vomiting, neck pain, stiffness, speech problems, 

weakness, trouble walking, tingling, numbness, dizziness, light-headedness


Preceding Symptoms: denies: visual disturbance, scotoma


Exacerbated By: denies: light, noise


Further Comments: no





- ROS


NEURO/PSYCH: denies: confusion, anxiety, depression, fainting


EYES/ENT: denies: sore throat, difficulty swallowing, sinus pain, drainage


CVS/RESP: none


GI/: denies: abdominal pain, diarrhea, problems urinating, incontinence


MS/SKIN/LYMPH: denies: muscle aches, back pain, rash, skin lesions, swollen 

glands


all systems neg except as marked: Yes





- PAST HX


Medical History: hypertension


Surgical History: no surgical history


Immunizations: referred to PCP


Allergies/Adverse Reactions: 


 Allergies











Allergy/AdvReac Type Severity Reaction Status Date / Time


 


morphine Allergy Severe Anaphylaxis Verified 07/28/17 17:45














Home Medications: 


 Ambulatory Orders











 Medication  Instructions  Recorded


 


Ramipril [Altace] 10 mg PO BID 02/16/17


 


Ibuprofen [Advil] 800 mg PO TID 07/10/17














- SOCIAL HX


Smoking History: non-smoker


Alcohol Use: none


Drug Use: none





- Family HX


Family History: none





- VITAL SIGNS


Vital Signs: 


 Vital Signs











Temp Pulse Resp BP Pulse Ox


 


 97.5 F L  86   16   132/88   99 


 


 08/11/17 23:00  08/11/17 23:00  08/11/17 23:00  08/11/17 23:00  08/11/17 23:00














- REVIEWED ASSESSMENTS


Nursing Assessment  Reviewed: Yes


Vitals Reviewed: Yes





Progress





- Results/Orders


Results/Orders: 





cbc, cmp, ua ordered in er





- Progress


Progress: 





pt. tx with 1 liter ns, 30 mg ketoralac iv in er with improved symptoms





Critical Care Note





- Critical Care Note


Total Time (mins): 0





ED Results Lab/Radiology





- Lab Results


Lab Results: 


 Lab Results











  08/11/17 08/11/17





  22:25 22:25


 


WBC    9.63 K/ul K/ul





    (4.00-12.00) 


 


RBC    4.73 M/ul M/ul





    (3.90-5.20) 


 


Hgb    14.1 g/dL g/dL





    (12.0-16.0) 


 


Hct    43.4 % %





    (34.5-46.5) 


 


MCV    91.6 fl fl





    (80.0-100.0) 


 


MCH    29.7 pg pg





    (28.0-34.0) 


 


MCHC    32.4 g/dL g/dL





    (30.0-36.0) 


 


RDW    13.0 % %





    (11.3-14.3) 


 


Plt Count    203 K/mm3 K/mm3





    (130-400) 


 


Neut % (Auto)    59.1 % %





    (39.0-79.0) 


 


Lymph % (Auto)    30.1 % %





    (16.0-50.0) 


 


Mono % (Auto)    6.4 % %





    (0.0-11.0) 


 


Eos % (Auto)    2.4 % %





    (0.0-6.8) 


 


Baso % (Auto)    0.3 





    (0.0-1.5) 


 


Neut # (Auto)    5.7 # k/uL # k/uL





    (1.4-7.7) 


 


Lymph # (Auto)    2.9 # k/uL # k/uL





    (0.6-4.0) 


 


Mono # (Auto)    0.6 # k/uL # k/uL





    (0.0-0.9) 


 


Eos # (Auto)    0.2 # k/uL # k/uL





    (0.0-0.6) 


 


Baso # (Auto)    0.0 # k/uL # k/uL





    (0.0-0.5) 


 


Reactive Lymphs %    1.7 % %





    (0.0-5.0) 


 


Reactive Lymphs #    0.2 # k/uL # k/uL





    (0.0-0.8) 


 


Sodium  135 mmol/L L mmol/L  





   (136-145)  


 


Potassium  5.6 mmol/L H mmol/L  





   (3.5-5.0)  


 


Chloride  106 mmol/L mmol/L  





   ()  


 


Carbon Dioxide  21 mmol/L mmol/L  





   (20-32)  


 


BUN  5 mg/dL L mg/dL  





   (10-26)  


 


Creatinine  0.7 mg/dL mg/dL  





   (0.4-1.5)  


 


Estimated Creat Clear  154   





   


 


Est GFR ( Amer)  > 60   





  (60 - ) 


 


Est GFR (Non-Af Amer)  > 60   





  (60 - ) 


 


Glucose  88 mg/dL mg/dL  





   (70-99)  


 


Calcium  9.4 mg/dL mg/dL  





   (8.5-10.5)  


 


Total Bilirubin  0.3 mg/dL mg/dL  





   (0.2-1.2)  


 


AST  27 U/L U/L  





   (0-41)  


 


ALT  22 U/L U/L  





   (0-45)  


 


Alkaline Phosphatase  86 U/L U/L  





   ()  


 


Total Protein  6.9 g/dL g/dL  





   (6.0-8.5)  


 


Albumin  4.2 g/dL g/dL  





   (3.0-5.5)  














- Radiology


Radiology Impressions: 


none ordered





- Orders


Orders: 


 ED Orders











 Category Date Time Status


 


 Place IV Lock 1T Care  08/11/17 20:57 Active


 


 CBC/PLATELET/DIFF Routine Lab  08/11/17 22:25 Completed


 


 CMP Routine Lab  08/11/17 22:25 Completed


 


 URINALYSIS Routine Lab  08/11/17 20:56 Ordered


 


 0.9 % Sodium Chloride [Normal Saline] 1,000 ml Med  08/11/17 21:00 Discontinued





 IV .Q1H   


 


 0.9 % Sodium Chloride [Normal Saline] 1,000 ml Med  08/11/17 21:27 Discontinued





 IV .STK-MED   


 


 Budesonide [Pulmicort] Med  08/11/17 22:42 Discontinued





 0.5 mg NEB .STK-MED ONE   


 


 Budesonide [Pulmicort] Med  08/11/17 21:00 Discontinued





 0.5 mg NEB BID   


 


 Ipratropium/Albuterol Sulfate [Duoneb] Med  08/11/17 20:57 Discontinued





 3 ml NEB NOW ONE   


 


 Ketorolac Tromethamine [Toradol] Med  08/11/17 20:57 Discontinued





 30 mg IVP NOW ONE   














Headache Physical Exam





- EXAM


General Appearance: no acute distress


EENT: no facial swelling, eyes nml inspection, PERRL, tender temporal artery, 

pain over sinuses


Neck: normal inspection, thyroid normal, supple


Respiratory: no resp distress, chest non-tender, breath sounds normal


CVS: reg. rate & rhythm, heart sounds nml


Abdomen: non-tender, no organomegaly, nml bowel sounds, no distention


Skin: color nml, no rash


Extremitites: non-tender, normal range of motion, no evidence of injury, no 

edema





- NEURO/PSYCH


Higher Functions: alert, oriented x3, nml speech, mood/affect nml


Cranial: nml as tested, no evidence of acute CVA


Cerebellar: nml as tested, nml gait


Sensorimotor: motor nml, sensation nml





Discharge


Clincal Impression: 


Cephalgia


Qualifiers:


 Headache type: tension-type Headache chronicity pattern: acute headache 

Intractability: intractable Qualified Code(s): G44.201 - Tension-type headache, 

unspecified, intractable





Referrals: 


Jorge Herrmann [Primary Care Provider] - 2 Days


Home Medications: 


Ambulatory Orders





Ramipril [Altace] 10 mg PO BID 02/16/17 


Ibuprofen [Advil] 800 mg PO TID 07/10/17 








Comments: 





Discharged home with precription for Meloxicam 7.5 mg 1 p.o. bid prn headache


Condition: Stable


Disposition: 01 HOME, SELF-CARE


Decision to Admit: NO


Decision Time: 22:54

## 2017-08-12 VITALS — SYSTOLIC BLOOD PRESSURE: 132 MMHG | DIASTOLIC BLOOD PRESSURE: 88 MMHG

## 2017-08-29 ENCOUNTER — HOSPITAL ENCOUNTER (EMERGENCY)
Dept: HOSPITAL 44 - ED | Age: 62
Discharge: HOME | End: 2017-08-29
Payer: COMMERCIAL

## 2017-08-29 VITALS — SYSTOLIC BLOOD PRESSURE: 139 MMHG | DIASTOLIC BLOOD PRESSURE: 84 MMHG

## 2017-08-29 DIAGNOSIS — R53.83: Primary | ICD-10-CM

## 2017-08-29 LAB
BASOPHILS NFR BLD: 0.7 % (ref 0–1.5)
EGFR (AFRICAN): > 60
EGFR (NON-AFRICAN): > 60
EOSINOPHIL NFR BLD: 2.1 % (ref 0–6.8)
MCH RBC QN AUTO: 29.4 PG (ref 28–34)
MCV RBC AUTO: 86.3 FL (ref 80–100)
MONOCYTES %: 7.4 % (ref 0–11)
NEUTROPHILS #: 5.4 # K/UL (ref 1.4–7.7)

## 2017-08-29 PROCEDURE — 85025 COMPLETE CBC W/AUTO DIFF WBC: CPT

## 2017-08-29 PROCEDURE — 81002 URINALYSIS NONAUTO W/O SCOPE: CPT

## 2017-08-29 PROCEDURE — 99283 EMERGENCY DEPT VISIT LOW MDM: CPT

## 2017-08-29 PROCEDURE — 87400 INFLUENZA A/B EACH AG IA: CPT

## 2017-08-29 PROCEDURE — 80053 COMPREHEN METABOLIC PANEL: CPT

## 2017-08-29 NOTE — ED PHYSICIAN DOCUMENTATION
General Adult





- HISTORIAN


Historian: patient





- HPI


Chief Complaint: General Adult


Onset: days ago


Timing: still present


Further Comments: yes (Patient slept 12 hours last night, gets hot and sweaty. 

Has been in [facility] for COPD, discharged about one week ago. No cough, 

no chest pain, no nausea vomiting or diarrhea, no urinary problems. Has been 

having some muscle aches.)


Last known Well Date: 08/28/17


Last Known Well Time: 09:00





- ROS


CONST: fever (?), chills (?)


EYES/ENT: denies: sore throat


CVS/RESP: none.  denies: chest pain, shortness of breath, cough


GI/: denies: abdominal pain, problems urinating, vomiting, nausea, diarrhea, 

black stools





- PAST HX


Past History: COPD, hypertension


Other History: none


Surgeries/Procedures: none


Immunizations: influenza, pneumovax


Allergies/Adverse Reactions: 


 Allergies











Allergy/AdvReac Type Severity Reaction Status Date / Time


 


morphine Allergy Severe Anaphylaxis Verified 07/28/17 17:45














Home Medications: 


 Ambulatory Orders











 Medication  Instructions  Recorded


 


Ramipril [Altace] 10 mg PO BID 02/16/17


 


Ibuprofen [Advil] 800 mg PO TID 07/10/17














- SOCIAL HX


Smoking History: non-smoker


Alcohol Use: none


Drug Use: none





- FAMILY HX


Family History: No





- VITAL SIGNS


Vital Signs: 





 Vital Signs











Temp Pulse Resp BP Pulse Ox


 


          132/88    


 


          08/11/17 23:00   














- REVIEWED ASSESSMENTS


Nursing Assessment  Reviewed: Yes


Vitals Reviewed: Yes





General Adult Physical Exam





- PHYSICAL EXAM


GENERAL APPEARANCE: mild distress


EENT: eye inspection normal, ENT inspection normal, pharynx normal (small oral 

phyarngeal airway).  No: exudate, dry mucous membranes


NECK: normal inspection, thyroid normal, supple.  No: lymphadenopathy, stiff 

neck


RESPIRATORY: no resp distress, chest non-tender, breath sounds normal.  No: 

wheezes, rales, rhonchi


CVS: reg rate & rhythm, heart sounds normal, equal pulses, no murmur, no gallop


ABDOMEN: soft, no organomegaly, normal bowel sounds, no abdominal bruit, no 

distension, non-tender.  No: hepatomegaly


SKIN: warm/dry, normal color


EXTREMITIES: non-tender, normal range of motion


NEURO: oriented X3, CN's nml as tested, mood/affect nml, cognition normal





Discharge


Clincal Impression: 


Fatigue


Qualifiers:


 Fatigue type: unspecified Qualified Code(s): R53.83 - Other fatigue





Referrals: 


Jorge Herrmann [Primary Care Provider] - 2 Days


Additional Instructions: 


Drink a lot of water, if your symptoms do not improve to follow up with Dr Tidwell.


Home Medications: 


Ambulatory Orders





Ramipril [Altace] 10 mg PO BID 02/16/17 


Ibuprofen [Advil] 800 mg PO TID 07/10/17 








Condition: Stable


Disposition: 01 HOME, SELF-CARE


Decision to Admit: NO


Date of Decison to Admit: 08/29/17


Decision Time: 20:52

## 2017-08-30 LAB
APPEARANCE UR: CLEAR
COLOR,URINE: YELLOW
PH UR STRIP: 6.5 [PH] (ref 5–8)
RBC UR QL: (no result)
UROBILINOGEN URINE: 0.2 EU (ref 0.2–1)

## 2017-09-01 ENCOUNTER — HOSPITAL ENCOUNTER (EMERGENCY)
Dept: HOSPITAL 44 - ED | Age: 62
LOS: 1 days | Discharge: HOME | End: 2017-09-02
Payer: COMMERCIAL

## 2017-09-01 DIAGNOSIS — J44.1: Primary | ICD-10-CM

## 2017-09-01 DIAGNOSIS — J45.909: ICD-10-CM

## 2017-09-01 LAB
BASOPHILS NFR BLD: 0.6 % (ref 0–1.5)
EOSINOPHIL NFR BLD: 2.6 % (ref 0–6.8)
MCH RBC QN AUTO: 28.5 PG (ref 28–34)
MCV RBC AUTO: 86.2 FL (ref 80–100)
MONOCYTES %: 8.4 % (ref 0–11)
NEUTROPHILS #: 6 # K/UL (ref 1.4–7.7)

## 2017-09-01 PROCEDURE — 85025 COMPLETE CBC W/AUTO DIFF WBC: CPT

## 2017-09-01 PROCEDURE — S1016 NON-PVC INTRAVENOUS ADMINIST: HCPCS

## 2017-09-01 PROCEDURE — 71020: CPT

## 2017-09-01 PROCEDURE — 96372 THER/PROPH/DIAG INJ SC/IM: CPT

## 2017-09-01 PROCEDURE — 80053 COMPREHEN METABOLIC PANEL: CPT

## 2017-09-01 PROCEDURE — 99283 EMERGENCY DEPT VISIT LOW MDM: CPT

## 2017-09-01 RX ADMIN — IPRATROPIUM BROMIDE AND ALBUTEROL SULFATE ONE ML: .5; 3 SOLUTION RESPIRATORY (INHALATION) at 22:30

## 2017-09-01 RX ADMIN — IPRATROPIUM BROMIDE AND ALBUTEROL SULFATE ONE ML: .5; 3 SOLUTION RESPIRATORY (INHALATION) at 22:05

## 2017-09-01 RX ADMIN — IPRATROPIUM BROMIDE AND ALBUTEROL SULFATE ONE ML: .5; 3 SOLUTION RESPIRATORY (INHALATION) at 23:15

## 2017-09-01 RX ADMIN — BUDESONIDE ONE MG: 0.5 SUSPENSION RESPIRATORY (INHALATION) at 22:20

## 2017-09-02 VITALS — DIASTOLIC BLOOD PRESSURE: 88 MMHG | SYSTOLIC BLOOD PRESSURE: 136 MMHG

## 2017-09-02 LAB
EGFR (AFRICAN): > 60
EGFR (NON-AFRICAN): > 60

## 2017-09-02 RX ADMIN — ALBUTEROL SULFATE ONE MG: 2.5 SOLUTION RESPIRATORY (INHALATION) at 00:00

## 2017-09-02 NOTE — DIAGNOSTIC IMAGING REPORT
GERI NORMAN~

 [facility]

 [address] [address] [address]

~

Report Submission Date: Sep 1, 2017 11:45:00 PM CDT







Patient ~ Study  

 

Name: HOSEA MORATAYA ~ Date: Sep 1, 2017 11:19:54 PM CDT

 

MRN: [id number] ~ Modality Type: CR

 

Gender: F ~ Description: CHEST

 

: 53 ~ Institution: [facility]

 

Physician: GERI NORMAN

  ~ ~ ~





~

Chest 2 views 



History: Shortness of breath 



Findings: Obesity and scattered calcified granulomas are observed. The lungs 
are well expanded without infiltrate or pleural effusion. Heart size and 
pulmonary vascularity are normal. 



Impression: No acute abnormality.

~

Electronically signed on Sep 1, 2017 11:45:00 PM CDT by:

Vincent MEDEROS

## 2017-09-02 NOTE — ED PHYSICIAN DOCUMENTATION
Asthma





- HISTORIAN


Historian: patient





- HPI


Stated Complaint: wheezing


Chief Complaint: Asthma


Additional Information: 





frequent episodes ac asthma-took neb at home still wheezing


Onset: hours (3)


Duration: continues in ED, worse


Initiating Event: denies: upper respiratory illness, out of meds, sports, 

exercise


Associated Symptoms:: trouble breathing, shortness of breath, chest tightness.  

denies: productive cough


Current Asthma Therapy: inhaled nebulizer, inhaled MDI, steroid





- ROS


CONST: no problems


CVS: denies: heart racing, palpitations


GI/: none


MS/SKIN/LYMPH: denies: ankle swelling, leg pain


NEURO/PSYCH: denies: headache, dizziness, tingling hands





- PAST HX


Asthma: frequent attacks


Lung Disease: asthma, COPD, other (htn)


Surgeries/Procedures: none


Allergies/Adverse Reactions: 


 Allergies











Allergy/AdvReac Type Severity Reaction Status Date / Time


 


morphine Allergy Severe Anaphylaxis Verified 09/01/17 22:02











Home Medications: 


 Ambulatory Orders











 Medication  Instructions  Recorded


 


Ramipril [Altace] 10 mg PO BID 02/16/17














- SOCIAL HX


Smoking History: non-smoker


Alcohol Use: none


Drug Use: none





- FAMILY HX


Family History: asthma





- VITAL SIGNS


Vital Signs: 





 Vital Signs











Temp Pulse Resp BP Pulse Ox


 


 98.5 F   117 H  22   154/86   99 


 


 09/01/17 21:50  09/01/17 21:50  09/01/17 21:50  09/01/17 21:50  09/02/17 00:00














- REVIEWED ASSESSMENTS


Nursing Assessment  Reviewed: Yes


Vitals Reviewed: Yes





ED Results Lab/Radiology





- Lab Results


Lab Results: 





 Lab Results











  09/01/17 09/01/17





  23:36 23:12


 


WBC    12.20 K/ul H K/ul





    (4.00-12.00) 


 


RBC    5.14 M/ul M/ul





    (3.90-5.20) 


 


Hgb    14.6 g/dL g/dL





    (12.0-16.0) 


 


Hct    44.3 % %





    (34.5-46.5) 


 


MCV    86.2 fl fl





    (80.0-100.0) 


 


MCH    28.5 pg pg





    (28.0-34.0) 


 


MCHC    33.1 g/dL g/dL





    (30.0-36.0) 


 


RDW    12.9 % %





    (11.3-14.3) 


 


Plt Count    235 K/mm3 K/mm3





    (130-400) 


 


Neut % (Auto)    49.3 % %





    (39.0-79.0) 


 


Lymph % (Auto)    37.1 % %





    (16.0-50.0) 


 


Mono % (Auto)    8.4 % %





    (0.0-11.0) 


 


Eos % (Auto)    2.6 % %





    (0.0-6.8) 


 


Baso % (Auto)    0.6 





    (0.0-1.5) 


 


Neut # (Auto)    6.0 # k/uL # k/uL





    (1.4-7.7) 


 


Lymph # (Auto)    4.5 # k/uL H # k/uL





    (0.6-4.0) 


 


Mono # (Auto)    1.0 # k/uL H # k/uL





    (0.0-0.9) 


 


Eos # (Auto)    0.3 # k/uL # k/uL





    (0.0-0.6) 


 


Baso # (Auto)    0.1 # k/uL # k/uL





    (0.0-0.5) 


 


Reactive Lymphs %    2.0 % %





    (0.0-5.0) 


 


Reactive Lymphs #    0.2 # k/uL # k/uL





    (0.0-0.8) 


 


Sodium  135 mmol/L L mmol/L  





   (136-145)  


 


Potassium  3.2 mmol/L L mmol/L  





   (3.5-5.0)  


 


Chloride  95 mmol/L L mmol/L  





   ()  


 


Carbon Dioxide  30 mmol/L mmol/L  





   (20-32)  


 


BUN  15 mg/dL mg/dL  





   (10-26)  


 


Creatinine  0.7 mg/dL mg/dL  





   (0.4-1.5)  


 


Estimated Creat Clear  175   





   


 


Est GFR ( Amer)  > 60   





  (60 - ) 


 


Est GFR (Non-Af Amer)  > 60   





  (60 - ) 


 


Glucose  130 mg/dL H mg/dL  





   (70-99)  


 


Calcium  10.2 mg/dL mg/dL  





   (8.5-10.5)  


 


Total Bilirubin  0.2 mg/dL mg/dL  





   (0.2-1.2)  


 


AST  23 U/L U/L  





   (0-41)  


 


ALT  24 U/L U/L  





   (0-45)  


 


Alkaline Phosphatase  96 U/L U/L  





   ()  


 


Total Protein  7.8 g/dL g/dL  





   (6.0-8.5)  


 


Albumin  4.8 g/dL g/dL  





   (3.0-5.5)  














- Orders


Orders: 





 ED Orders











 Category Date Time Status


 


 Assess pulse oximetry Q1H Care  09/01/17 22:44 Active


 


 CHEST P.A.&LAT 2 VIEWS [RAD] Stat Exams  09/01/17 Taken


 


 CBC/PLATELET/DIFF Routine Lab  09/01/17 23:12 Completed


 


 CMP Routine Lab  09/01/17 23:36 Completed


 


 Albuterol Sulfate [Ventolin] Med  09/01/17 23:58 Discontinued





 2.5 mg NEB .STK-MED ONE   


 


 Albuterol Sulfate [Ventolin] Med  09/01/17 23:59 Discontinued





 2.5 mg NEB NOW ONE   


 


 Budesonide [Pulmicort] Med  09/01/17 22:17 Discontinued





 0.5 mg NEB .STK-MED ONE   


 


 Budesonide [Pulmicort] Med  09/02/17 22:20 Once





 0.5 mg NEB BID ONE   


 


 Ipratropium/Albuterol Sulfate [Duoneb] Med  09/01/17 21:52 Discontinued





 3 ml NEB .STK-MED ONE   


 


 Ipratropium/Albuterol Sulfate [Duoneb] Med  09/01/17 22:05 Discontinued





 3 ml NEB NOW ONE   


 


 Ipratropium/Albuterol Sulfate [Duoneb] Med  09/01/17 22:29 Discontinued





 3 ml NEB NOW ONE   


 


 Ipratropium/Albuterol Sulfate [Duoneb] Med  09/01/17 22:44 Discontinued





 3 ml NEB NOW ONE   


 


 methylPREDNISolone SOD SUCC [Solu-MEDROL] Med  09/01/17 22:18 Discontinued





 80 mg .ROUTE .STK-MED ONE   


 


 methylPREDNISolone SOD SUCC [Solu-MEDROL] Med  09/02/17 22:20 Once





 80 mg IM Q8 ONE   


 


 EKG WITH COMPARISON Stat Ther  09/01/17 Ordered


 


 EKG WITH COMPARISON Stat Ther  09/01/17 Ordered














Asthma Physical Exam





- EXAM


General Appearance: moderate distress


EENT: eye inspection normal


Neck: nml inspection


Respiratory: speaks full sentences, prolonged expirations, accessory muscle use

, decreased air movement, wheezes, rales, rhonchi.  No: retractions, stridor, 

resp. fatigue


CVS: reg rate & rhythm, heart sounds normal


Abdomen: non-tender, no distention


Skin: color nml.  No: cyanosis, diaphoresis, pallor, ecchymosis


Extremities: non-tender, normal range of motion


Neuro/Psych: oriented x3, neuro intact, mood/affect nml





Discharge


Clincal Impression: 


 acute exaberation copd, asthma





Referrals: 


Jorge Herrmann [Primary Care Provider] - 2 Days


Home Medications: 


Ambulatory Orders





Ramipril [Altace] 10 mg PO BID 02/16/17 








Comments: 





sig improved amb w/o difficulty


Condition: Good


Disposition: 01 HOME, SELF-CARE


Decision to Admit: NO


Decision Time: 00:20

## 2018-04-22 ENCOUNTER — HOSPITAL ENCOUNTER (EMERGENCY)
Dept: HOSPITAL 44 - ED | Age: 63
Discharge: HOME | End: 2018-04-22
Payer: COMMERCIAL

## 2018-04-22 VITALS — SYSTOLIC BLOOD PRESSURE: 152 MMHG | DIASTOLIC BLOOD PRESSURE: 74 MMHG

## 2018-04-22 DIAGNOSIS — S39.012A: Primary | ICD-10-CM

## 2018-04-22 DIAGNOSIS — M47.816: ICD-10-CM

## 2018-04-22 DIAGNOSIS — K76.0: ICD-10-CM

## 2018-04-22 DIAGNOSIS — J40: ICD-10-CM

## 2018-04-22 DIAGNOSIS — N30.01: ICD-10-CM

## 2018-04-22 DIAGNOSIS — M41.9: ICD-10-CM

## 2018-04-22 DIAGNOSIS — I10: ICD-10-CM
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
## 2018-04-22 LAB
APPEARANCE UR: CLEAR
BASOPHILS NFR BLD: 0.6 % (ref 0–1.5)
COLOR,URINE: YELLOW
EGFR (AFRICAN): > 60
EGFR (NON-AFRICAN): > 60
EOSINOPHIL NFR BLD: 3.1 % (ref 0–6.8)
MCH RBC QN AUTO: 29 PG (ref 28–34)
MCV RBC AUTO: 88.9 FL (ref 80–100)
MONOCYTES %: 8.1 % (ref 0–11)
NEUTROPHILS #: 3.7 # K/UL (ref 1.4–7.7)
PH UR STRIP: 7 [PH] (ref 5–8)
RBC UR QL: (no result)
UROBILINOGEN URINE: 0.2 EU (ref 0.2–1)

## 2018-04-22 PROCEDURE — 96372 THER/PROPH/DIAG INJ SC/IM: CPT

## 2018-04-22 PROCEDURE — 85025 COMPLETE CBC W/AUTO DIFF WBC: CPT

## 2018-04-22 PROCEDURE — 81002 URINALYSIS NONAUTO W/O SCOPE: CPT

## 2018-04-22 PROCEDURE — 80053 COMPREHEN METABOLIC PANEL: CPT

## 2018-04-22 PROCEDURE — 99283 EMERGENCY DEPT VISIT LOW MDM: CPT

## 2018-04-22 PROCEDURE — 74176 CT ABD & PELVIS W/O CONTRAST: CPT

## 2018-04-22 NOTE — DIAGNOSTIC IMAGING REPORT
JOCELYNE DIXON 

[facility]

[address] [facility] [address] [place] [address]. [phone]

 

 

 

 

Report Submission Date: 2018 4:14:26 AM CDT

Patient       Study

Name:   HOSEA MORATAYA       Date:   2018 3:43:00 AM CDT

MRN:   [id number]       Modality Type:   CT\SR

Gender:   F       Description:   CT ABD PELVIS W/O CO

:   53       Institution:   [facility]

Physician:   JOCELYNE DIXON

     Accession:    [id number]

 

 

CT abdomen and pelvis without contrast 

Clinical history: LOW BACK AND FLANK PAIN 

Technique: CT of the abdomen and pelvis was performed without oral or 
intravenous administration of contrast. Sagittal and coronal reconstructions 
are performed by the technologist. 

Findings: Minimal dependent atelectasis in the lung bases. Mild prominence of 
bronchi in the lower lobes consistent with bibasilar bronchitis. Liver is 
diffusely hypodense consistent with hepatic steatosis. There is no focal 
abnormality in the liver or spleen. Multiple calcified granulomata in the 
spleen. There is no pancreatic or adrenal abnormality. There are intrarenal 
calculi the right kidney. There is no hydronephrosis or perinephric stranding 
and no evidence of bladder or ureteral calculus. Appendix is visualized and is 
within normal limits. Structures related to the gastrointestinal tract are 
unremarkable. Uterus and adnexal structures are within normal limits. 
Spondylosis and scoliosis are evident in the lumbar vertebrae. 

Impression: 

1. Bibasilar bronchitis. 

2. Hepatic steatosis. 

3. Lumbar spondylosis and scoliosis. 

4. 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

 

Electronically signed on 2018 4:14:26 AM CDT by:

Pipo MEDEROS

## 2018-04-22 NOTE — ED PHYSICIAN DOCUMENTATION
General Adult





- HISTORIAN


Historian: patient





- HPI


Stated Complaint: Bilat flank pain


Chief Complaint: General Adult


Additional Information: 





Pain began yesterday morning and is constant, but waxes and wanes. Radiates 

into thighs. Walking makes it worse. Took tylenol twice w/o relief; last dose 

last evening. Never had this pain before. Has been urinating more frequently 

since this pain began. No fever, No other associated signs or modifying 

factors. 





- ROS


CONST: denies: fever


NEURO/PSYCH: other (no l;oss bowel or bladder control, no paralysis or weakness)

.  denies: tingling, numbness





- PAST HX


Past History: hypertension


Allergies/Adverse Reactions: 


 Allergies











Allergy/AdvReac Type Severity Reaction Status Date / Time


 


morphine Allergy Severe Anaphylaxis Verified 18 02:32














Home Medications: 


 Ambulatory Orders











 Medication  Instructions  Recorded


 


Ramipril [Altace] 10 mg PO BID 17














- SOCIAL HX


Smoking History: non-smoker, chew





- FAMILY HX


Family History: No





- VITAL SIGNS


Vital Signs: 





 Vital Signs











Temp Pulse Resp BP Pulse Ox


 


 96.6 F L  113 H  16   152/74   98 


 


 18 02:24  18 02:24  18 02:24  18 02:24  18 02:24














- REVIEWED ASSESSMENTS


Nursing Assessment  Reviewed: Yes


Vitals Reviewed: Yes





Progress





- Progress


Progress: 





Name:   HOSEA MORATAYA       Date:   2018 3:43:00 AM CDT


MRN:   [id number]       Modality Type:   CT\SR


Gender:   F       Description:   CT ABD PELVIS W/O CO


:   53       Institution:   [facility]


Physician:   JOCELYNE DIXON - FRANCINE


     Accession:    [id number]


 


 


CT abdomen and pelvis without contrast 


Clinical history: LOW BACK AND FLANK PAIN 


Technique: CT of the abdomen and pelvis was performed without oral or 

intravenous administration of contrast. Sagittal and coronal reconstructions 

are performed by the technologist. 


Findings: Minimal dependent atelectasis in the lung bases. Mild prominence of 

bronchi in the lower lobes consistent with bibasilar bronchitis. Liver is 

diffusely hypodense consistent with hepatic steatosis. There is no focal 

abnormality in the liver or spleen. Multiple calcified granulomata in the 

spleen. There is no pancreatic or adrenal abnormality. There are intrarenal 

calculi the right kidney. There is no hydronephrosis or perinephric stranding 

and no evidence of bladder or ureteral calculus. Appendix is visualized and is 

within normal limits. Structures related to the gastrointestinal tract are 

unremarkable. Uterus and adnexal structures are within normal limits. 

Spondylosis and scoliosis are evident in the lumbar vertebrae. 


Impression: 


1. Bibasilar bronchitis. 


2. Hepatic steatosis. 


3. Lumbar spondylosis and scoliosis. 


4. 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


 


Electronically signed on 2018 4:14:26 AM CDT by:


Pipo Kwong








Feels better after meds. 





ED Results Lab/Radiology





- Orders


Orders: 





 ED Orders











 Category Date Time Status


 


 LUMBAR SPINE XR 2 OR 3 VIEWS [L SPINE 2 OR 3 VIEWS] [ Exams  18 Ordered





 RAD] Stat   


 


 CBC/PLATELET/DIFF Routine Lab  18 Ordered


 


 CMP Routine Lab  18 Ordered


 


 URINALYSIS Routine Lab  18 Ordered


 


 Ketorolac Tromethamine [Toradol] Med  18 02:48 Discontinued





 60 mg IM NOW ONE   


 


 Orphenadrine Citrate [Norflex] Med  18 02:48 Discontinued





 60 mg IM NOW ONE   














General Adult Physical Exam





- PHYSICAL EXAM


GENERAL APPEARANCE: moderate distress


EENT: eye inspection normal, other (very poor dentition)


NECK: normal inspection, supple


RESPIRATORY: breath sounds normal


CVS: reg rate & rhythm, heart sounds normal


ABDOMEN: soft, normal bowel sounds


BACK: normal inspection, no CVA tenderness, other (no vertebral tenderness. 

Tender to palpation jocelyn lumbar paraspinous muscles, L>R. )


SKIN: warm/dry


EXTREMITIES: normal range of motion, no evidence of injury, no edema


NEURO: CN's nml as tested, motor nml, sensation nml, other (jocelyn SLR to 80 

degrees causes knee pain)





Discharge


Clincal Impression: 


 Bronchitis





Acute lumbar myofascial strain


Qualifiers:


 Encounter type: initial encounter Qualified Code(s): S39.012A - Strain of 

muscle, fascia and tendon of lower back, initial encounter





Urinary tract infection


Qualifiers:


 Urinary tract infection type: acute cystitis Hematuria presence: with 

hematuria Qualified Code(s): N30.01 - Acute cystitis with hematuria





Referrals: 


Jorge Herrmann [Primary Care Provider] - 2 Days


Additional Instructions: 


Drink plenty of water. Avoid all smoke. Ice or gentle heat to the sore areas of 

your back at least 4 times a day for a week. 


Condition: Good


Disposition: 01 HOME, SELF-CARE


Decision to Admit: NO


Decision Time: 04:20

## 2018-05-05 ENCOUNTER — HOSPITAL ENCOUNTER (EMERGENCY)
Dept: HOSPITAL 44 - ED | Age: 63
Discharge: HOME | End: 2018-05-05
Payer: SELF-PAY

## 2018-05-05 DIAGNOSIS — R60.0: Primary | ICD-10-CM

## 2018-05-05 PROCEDURE — 99282 EMERGENCY DEPT VISIT SF MDM: CPT

## 2018-05-05 NOTE — ED PHYSICIAN DOCUMENTATION
General Adult





- HISTORIAN


Historian: patient, child





- HPI


Stated Complaint: legs swollen


Chief Complaint: General Adult


Additional Information: 





Legs swollen and it hurts to walk on her feet. She began naprosyn 500 mg bid on 

5/2, for hip pain.





- ROS


CONST: no problems


CVS/RESP: none.  denies: shortness of breath





- PAST HX


Past History: hypertension, other (skin cancer)


Allergies/Adverse Reactions: 


 Allergies











Allergy/AdvReac Type Severity Reaction Status Date / Time


 


morphine Allergy Severe Anaphylaxis Verified 04/22/18 02:32














Home Medications: 


 Ambulatory Orders











 Medication  Instructions  Recorded


 


Ramipril [Altace] 10 mg PO BID 02/16/17


 


Acetaminophen [Extra Strength 1,000 mg PO Q8H PRN #100 tablet 04/22/18





Non-Aspirin]  


 


Azithromycin [Zithromax] 250 mg PO DAILY #4 tablet 04/22/18


 


Ibuprofen [Motrin Ib] 600 mg PO Q8H PRN #60 tablet 04/22/18


 


Nitrofurantoin Monohyd/M-Cryst 100 mg PO BID #14 capsule 04/22/18





[Macrobid]  














- SOCIAL HX


Smoking History: secondhand





- FAMILY HX


Family History: No





- VITAL SIGNS


Vital Signs: 





 Vital Signs











Temp Pulse Resp BP Pulse Ox


 


          152/74    


 


          04/22/18 04:44   














- REVIEWED ASSESSMENTS


Nursing Assessment  Reviewed: Yes


Vitals Reviewed: Yes





General Adult Physical Exam





- PHYSICAL EXAM


GENERAL APPEARANCE: obese


EENT: eye inspection normal


NECK: normal inspection, supple


RESPIRATORY: no resp distress, breath sounds normal


CVS: reg rate & rhythm, heart sounds normal


ABDOMEN: soft


BACK: normal inspection


SKIN: warm/dry, normal color (except lower legs with erythema (has been in sun, 

she says))


EXTREMITIES: normal range of motion, no evidence of injury, edema (jocelyn ankles 

with non pitting edema, R>L)


NEURO: CN's nml as tested, motor nml, sensation nml





Discharge


Clincal Impression: 


 Ankle edema, bilateral





Referrals: 


Jorge Herrmann [Primary Care Provider] - 2 Days


Additional Instructions: 


Stop the naprosyn. You can take 1000 mg of tylenol as often as every 8 hours if 

needed for discomfort. 


Condition: Good


Disposition: 01 HOME, SELF-CARE


Decision to Admit: NO


Decision Time: 10:55

## 2018-05-06 VITALS — SYSTOLIC BLOOD PRESSURE: 155 MMHG | DIASTOLIC BLOOD PRESSURE: 85 MMHG

## 2018-05-11 ENCOUNTER — HOSPITAL ENCOUNTER (EMERGENCY)
Dept: HOSPITAL 44 - ED | Age: 63
LOS: 1 days | Discharge: HOME | End: 2018-05-12
Payer: SELF-PAY

## 2018-05-11 DIAGNOSIS — M75.51: Primary | ICD-10-CM

## 2018-05-11 PROCEDURE — 73030 X-RAY EXAM OF SHOULDER: CPT

## 2018-05-11 PROCEDURE — 99283 EMERGENCY DEPT VISIT LOW MDM: CPT

## 2018-05-12 VITALS — DIASTOLIC BLOOD PRESSURE: 74 MMHG | SYSTOLIC BLOOD PRESSURE: 126 MMHG

## 2018-05-12 NOTE — DIAGNOSTIC IMAGING REPORT
REESE CARUSO 

[facility]

[address] [facility].[address] [address]

 

 

 

 

Report Submission Date: May 12, 2018 12:42:57 AM CDT

Patient       Study

Name:   HOSEA MORATAYA       Date:   May 12, 2018 12:13:29 AM CDT

MRN:   [id number]       Modality Type:   DX

Gender:   F       Description:   SHOULDER

:   53       Institution:   [facility]

Physician:   REESE CARUSO

     Accession:    [id number]

 

 

3 views of the right shoulder 

Clinical history: PAIN WITH NO KNOWN INJURY X 2 DAYS 

Findings: Examination right shoulder in multiple views fails to demonstrate 
evidence of fracture, dislocation or other bone or joint pathology.

 

Electronically signed on May 12, 2018 12:42:57 AM CDT by:

Pipo MEDEROS

## 2018-05-12 NOTE — ED PHYSICIAN DOCUMENTATION
Shoulder Injury/Pain





- HISTORIAN


Historian: patient





- HPI


Stated Complaint: right shoulder pain


Chief Complaint: Shoulder Injury/ Pain


Additional Information: 





right upper arm pain started today


Onset: today


Where: home


Severity: moderate


Pain: persistent


Context: other (no known injury, son said he came home and she was sleeping on 

that arm)


Further Comments: no





- ROS


CONST: no problems


CVS/RESP: none


GI/: nausea


MS/SKIN/LYMPH: other (right upper arm pain)


NEURO: none





- PAST HX


Past History: other (htn)


Immunizations: referred to PCP


Allergies/Adverse Reactions: 


 Allergies











Allergy/AdvReac Type Severity Reaction Status Date / Time


 


morphine Allergy Severe Anaphylaxis Verified 05/11/18 23:42














Home Medications: 


 Ambulatory Orders











 Medication  Instructions  Recorded


 


Ramipril [Altace] 10 mg PO BID 02/16/17














- SOCIAL HX


Smoking History: chew


Alcohol Use: none


Drug Use: none





- FAMILY HX


Family History: no significant history





- VITAL SIGNS


Vital Signs: 





 Vital Signs











Temp Pulse Resp BP Pulse Ox


 


 98.2 F   76   16   178/87   97 


 


 05/11/18 23:43  05/11/18 23:43  05/11/18 23:43  05/11/18 23:43  05/11/18 23:43














- REVIEWED ASSESSMENT


Nursing Assessment  Reviewed: Yes


Vitals Reviewed: Yes





Progress





- Results/Orders


Results/Orders: 





x-ray right shoulder ordered





- Progress


Progress: 





pt. stable entire time in ER





Critical Care Note





- Critical Care Note


Total Time (mins): 0





ED Results Lab/Radiology





- Lab Results


Lab Results: 





none ordered





- Radiology


Radiology Impressions: 





x-ray right shoulder





- Orders


Orders: 





 ED Orders











 Category Date Time Status


 


 SHOULDER 2 VIEWS OR MORE [RAD] Stat Exams  05/11/18 Completed














Shoulder Injury Physical Exam





- Physical Exam


General Appearance: no acute distress


Shoulder: no acute distress, full ROM, soft-tissue tenderness (deltod, biceps, 

triceps).  No: bony tenderness, swelling


Upper Extremity: no injury below shoulder, soft-tissue tenderness


Neuro: sensation nml, motor nml


Vascular: no vascular compromise, motor nml, sensation nml


Skin: warm/dry, normal color


Head/ENT: nml inspection, pharynx nml


Respiratory: chest non-tender, no ecchymosis, breath sounds nml, no resp. 

distress, heart sounds nml


CVS: reg rate & rhythm, heart sounds normal, equal pulses, no murmur, no gallop


Abdomen: soft, no organomegaly, normal bowel sounds, no abdominal bruit, no 

distension





Discharge


Clincal Impression: 


Bursitis


Qualifiers:


 Bursitis location: shoulder Laterality: right Qualified Code(s): M75.51 - 

Bursitis of right shoulder





Referrals: 


Jorge Herrmann [Primary Care Provider] - 2 Days


Comments: 





Discharged in stable condition with script for Meloxicam 7.5 mg #20 1 p.o. bid


Condition: Stable


Disposition: 01 HOME, SELF-CARE


Decision to Admit: NO


Decision Time: 01:24

## 2018-06-30 ENCOUNTER — HOSPITAL ENCOUNTER (EMERGENCY)
Dept: HOSPITAL 44 - ED | Age: 63
Discharge: HOME | End: 2018-06-30
Payer: SELF-PAY

## 2018-06-30 VITALS — DIASTOLIC BLOOD PRESSURE: 86 MMHG | SYSTOLIC BLOOD PRESSURE: 149 MMHG

## 2018-06-30 DIAGNOSIS — R60.0: Primary | ICD-10-CM

## 2018-06-30 PROCEDURE — 99282 EMERGENCY DEPT VISIT SF MDM: CPT

## 2018-06-30 NOTE — ED PHYSICIAN DOCUMENTATION
General Adult





- HISTORIAN


Historian: patient





- HPI


Stated Complaint: swelling in ankles


Chief Complaint: Lower Extremity Problem


Onset: other (2 weeks )


Timing: still present


Severity: mild


Further Comments: yes (she states for two weeks now she has had some increased 

swelling in her lower legs/ankles. She states she did ride from [place] in 

last day and has had increased swelling. She has no other complaints. No chest 

pain. No shortness of breath. No injury . She does not drink fluids well)


Last known Well Code/Unknown Code: Unknown





- ROS


CONST: no problems


EYES/ENT: none


CVS/RESP: denies: chest pain, shortness of breath


GI/: denies: abdominal pain, problems urinating, vomiting, nausea, diarrhea


MS/SKIN/LYMPH: ankle swelling.  denies: rash


NEURO/PSYCH: denies: headache, fainting, dizziness





- PAST HX


Past History: none


Other History: none


Surgeries/Procedures: none


Immunizations: UTD


Allergies/Adverse Reactions: 


 Allergies











Allergy/AdvReac Type Severity Reaction Status Date / Time


 


morphine Allergy Severe Anaphylaxis Verified 05/11/18 23:42














Home Medications: 


 Ambulatory Orders











 Medication  Instructions  Recorded


 


Ramipril [Altace] 10 mg PO BID 02/16/17














- SOCIAL HX


Smoking History: chew


Alcohol Use: none


Drug Use: none





- FAMILY HX


Family History: No





- VITAL SIGNS


Vital Signs: 





 Vital Signs











Temp Pulse Resp BP Pulse Ox


 


          126/74    


 


          05/12/18 01:25   














- REVIEWED ASSESSMENTS


Nursing Assessment  Reviewed: Yes


Vitals Reviewed: Yes





General Adult Physical Exam





- PHYSICAL EXAM


GENERAL APPEARANCE: no distress


EENT: eye inspection normal, ENT inspection normal, JIMMY


RESPIRATORY: no resp distress, chest non-tender, breath sounds normal


CVS: reg rate & rhythm, heart sounds normal, equal pulses, no murmur


ABDOMEN: soft, normal bowel sounds, no distension, non-tender


BACK: normal inspection, no CVA tenderness


SKIN: warm/dry


EXTREMITIES: non-tender, normal range of motion, no evidence of injury, edema (

mild edema in bilateral ankles. )


NEURO: oriented X3, CN's nml as tested, motor nml, sensation nml, mood/affect 

nml, cognition normal





Discharge


Clincal Impression: 


 Ankle edema, bilateral





Referrals: 


Jorge Herrmann [Primary Care Provider] - 2 Days


Additional Instructions: 


1. elevate feet 


2. Increase fluids


3. See PCP in 2-4 days


4. Return to ER for any concerns 


Condition: Stable


Disposition: 01 HOME, SELF-CARE


Decision to Admit: NO


Date of Decison to Admit: 06/30/18


Decision Time: 00:45